# Patient Record
Sex: FEMALE | Race: BLACK OR AFRICAN AMERICAN | URBAN - METROPOLITAN AREA
[De-identification: names, ages, dates, MRNs, and addresses within clinical notes are randomized per-mention and may not be internally consistent; named-entity substitution may affect disease eponyms.]

---

## 2021-12-02 ENCOUNTER — OFFICE VISIT (OUTPATIENT)
Dept: URBAN - METROPOLITAN AREA CLINIC 40 | Facility: CLINIC | Age: 51
End: 2021-12-02
Payer: COMMERCIAL

## 2021-12-02 ENCOUNTER — WEB ENCOUNTER (OUTPATIENT)
Dept: URBAN - METROPOLITAN AREA CLINIC 40 | Facility: CLINIC | Age: 51
End: 2021-12-02

## 2021-12-02 VITALS
BODY MASS INDEX: 36.32 KG/M2 | DIASTOLIC BLOOD PRESSURE: 80 MMHG | WEIGHT: 226 LBS | HEART RATE: 84 BPM | HEIGHT: 66 IN | TEMPERATURE: 97.7 F | SYSTOLIC BLOOD PRESSURE: 140 MMHG

## 2021-12-02 DIAGNOSIS — R14.0 BLOATING: ICD-10-CM

## 2021-12-02 DIAGNOSIS — Z86.39 HISTORY OF DIABETES MELLITUS: ICD-10-CM

## 2021-12-02 DIAGNOSIS — R68.81 EARLY SATIETY: ICD-10-CM

## 2021-12-02 DIAGNOSIS — R19.5 LOOSE STOOLS: ICD-10-CM

## 2021-12-02 PROCEDURE — 99203 OFFICE O/P NEW LOW 30 MIN: CPT | Performed by: PHYSICIAN ASSISTANT

## 2021-12-02 RX ORDER — LEVOTHYROXINE SODIUM 0.11 MG/1
1 TABLET IN THE MORNING ON AN EMPTY STOMACH TABLET ORAL ONCE A DAY
Status: ACTIVE | COMMUNITY

## 2021-12-02 RX ORDER — GLYBURIDE 5 MG/1
1 TABLET WITH BREAKFAST OR THE FIRST MAIN MEAL OF THE DAY TABLET ORAL ONCE A DAY
Status: ACTIVE | COMMUNITY

## 2021-12-02 RX ORDER — PRAVASTATIN SODIUM 10 MG/1
1 TABLET TABLET ORAL ONCE A DAY
Status: ACTIVE | COMMUNITY

## 2021-12-02 NOTE — HPI-TODAY'S VISIT:
Ms. Layton is a 51 year old black female who presents to the office today with increasing sensation of gas/bloat symptoms, early satiety.  No nausea or vomiting or dysphagia.  Obese.  Diabetic.  States that she has not had prior upper endoscopy.  Occasional GERD symptoms.  Admits that her diet is poor, oftentimes eating processed sweets and refined starches.  Admits to during the day she often snacks on potato chips.  Trying to increase her water intake.  No significant alcohol or tobacco.  No family history of colorectal cancer.  She does state that she has had a colonoscopy within the last 10 years which was normal outside of nonbleeding internal hemorrhoids.  Occasionally has intermittent loose stool.  Trying to manage her blood sugars.  Denies any rectal bleeding or melena.  Overall good appetite.  Does not get much physical activity or exercise outside of work.  Does not give history of recent use of antibiotics.

## 2022-01-04 ENCOUNTER — DASHBOARD ENCOUNTERS (OUTPATIENT)
Age: 52
End: 2022-01-04

## 2022-01-06 ENCOUNTER — OFFICE VISIT (OUTPATIENT)
Dept: URBAN - METROPOLITAN AREA CLINIC 40 | Facility: CLINIC | Age: 52
End: 2022-01-06
Payer: COMMERCIAL

## 2022-01-06 VITALS
SYSTOLIC BLOOD PRESSURE: 120 MMHG | HEART RATE: 81 BPM | DIASTOLIC BLOOD PRESSURE: 68 MMHG | BODY MASS INDEX: 35.68 KG/M2 | WEIGHT: 222 LBS | HEIGHT: 66 IN | TEMPERATURE: 97.7 F

## 2022-01-06 DIAGNOSIS — R19.5 LOOSE STOOLS: ICD-10-CM

## 2022-01-06 DIAGNOSIS — R68.81 EARLY SATIETY: ICD-10-CM

## 2022-01-06 DIAGNOSIS — Z86.39 HISTORY OF DIABETES MELLITUS: ICD-10-CM

## 2022-01-06 DIAGNOSIS — R14.0 BLOATING: ICD-10-CM

## 2022-01-06 PROCEDURE — 99212 OFFICE O/P EST SF 10 MIN: CPT | Performed by: PHYSICIAN ASSISTANT

## 2022-01-06 RX ORDER — PRAVASTATIN SODIUM 10 MG/1
1 TABLET TABLET ORAL ONCE A DAY
Status: ACTIVE | COMMUNITY

## 2022-01-06 RX ORDER — LEVOTHYROXINE SODIUM 0.11 MG/1
1 TABLET IN THE MORNING ON AN EMPTY STOMACH TABLET ORAL ONCE A DAY
Status: ACTIVE | COMMUNITY

## 2022-01-06 RX ORDER — GLYBURIDE 5 MG/1
1 TABLET WITH BREAKFAST OR THE FIRST MAIN MEAL OF THE DAY TABLET ORAL ONCE A DAY
Status: ACTIVE | COMMUNITY

## 2022-01-06 NOTE — HPI-TODAY'S VISIT:
Ms. Mena is a 51 year old black female last seen in the office for initial visit 12/2/21, with increasing sensation of gas/bloat symptoms, early satiety.  No nausea or vomiting or dysphagia.  Obese.  Diabetic.  States that she has not had prior upper endoscopy.  Occasional GERD symptoms.  Admits that her diet is poor, oftentimes eating processed sweets and refined starches.  Admits to during the day she often snacks on potato chips.  Trying to increase her water intake.  No significant alcohol or tobacco.  No family history of colorectal cancer.  She does state that she has had a colonoscopy within the last 10 years which was normal outside of nonbleeding internal hemorrhoids.  Occasionally has intermittent loose stool.  Trying to manage her blood sugars.  Denies any rectal bleeding or melena.  Overall good appetite.  Does not get much physical activity or exercise outside of work.  Does not give history of recent use of antibiotics. Ongoing symptoms, still with a poor diet.  Has not complete nuclear medicine gastric emptying study as ordered due to high cost.  With her insurance, she is unable to have imaging at [facility].

## 2022-01-10 PROBLEM — 161445009 HISTORY OF DIABETES MELLITUS: Status: ACTIVE | Noted: 2021-12-02

## 2022-02-04 ENCOUNTER — TELEPHONE ENCOUNTER (OUTPATIENT)
Dept: URBAN - METROPOLITAN AREA CLINIC 96 | Facility: CLINIC | Age: 52
End: 2022-02-04

## 2022-10-26 NOTE — PHYSICAL EXAM GASTROINTESTINAL
Abdomen , soft, nontender, nondistended , no guarding or rigidity , no masses palpable , normal bowel sounds , Liver and Spleen , no hepatomegaly present , no hepatosplenomegaly , liver nontender , spleen not palpable  What Type Of Note Output Would You Prefer (Optional)?: Standard Output How Severe Are Your Spot(S)?: mild Have Your Spot(S) Been Treated In The Past?: has not been treated Hpi Title: Evaluation of Skin Lesions Family Member: Daughter